# Patient Record
Sex: FEMALE | Race: WHITE | NOT HISPANIC OR LATINO | Employment: FULL TIME | URBAN - NONMETROPOLITAN AREA
[De-identification: names, ages, dates, MRNs, and addresses within clinical notes are randomized per-mention and may not be internally consistent; named-entity substitution may affect disease eponyms.]

---

## 2019-01-04 ENCOUNTER — OFFICE VISIT (OUTPATIENT)
Dept: URGENT CARE | Facility: PHYSICIAN GROUP | Age: 54
End: 2019-01-04
Payer: MEDICAID

## 2019-01-04 VITALS
TEMPERATURE: 98 F | HEART RATE: 134 BPM | BODY MASS INDEX: 24.66 KG/M2 | RESPIRATION RATE: 16 BRPM | SYSTOLIC BLOOD PRESSURE: 188 MMHG | HEIGHT: 62 IN | WEIGHT: 134 LBS | OXYGEN SATURATION: 95 % | DIASTOLIC BLOOD PRESSURE: 98 MMHG

## 2019-01-04 DIAGNOSIS — R51.9 NONINTRACTABLE HEADACHE, UNSPECIFIED CHRONICITY PATTERN, UNSPECIFIED HEADACHE TYPE: ICD-10-CM

## 2019-01-04 DIAGNOSIS — R42 DIZZY: ICD-10-CM

## 2019-01-04 DIAGNOSIS — R07.89 CHEST DISCOMFORT: ICD-10-CM

## 2019-01-04 DIAGNOSIS — R94.31 ABNORMAL EKG: ICD-10-CM

## 2019-01-04 DIAGNOSIS — R03.0 ELEVATED BLOOD PRESSURE READING: ICD-10-CM

## 2019-01-04 LAB
APPEARANCE UR: CLEAR
BILIRUB UR STRIP-MCNC: NORMAL MG/DL
COLOR UR AUTO: YELLOW
GLUCOSE UR STRIP.AUTO-MCNC: NORMAL MG/DL
KETONES UR STRIP.AUTO-MCNC: 80 MG/DL
LEUKOCYTE ESTERASE UR QL STRIP.AUTO: NORMAL
NITRITE UR QL STRIP.AUTO: NORMAL
PH UR STRIP.AUTO: 6 [PH] (ref 5–8)
PROT UR QL STRIP: 30 MG/DL
RBC UR QL AUTO: NORMAL
SP GR UR STRIP.AUTO: <1.005
UROBILINOGEN UR STRIP-MCNC: 0.2 MG/DL

## 2019-01-04 PROCEDURE — 99204 OFFICE O/P NEW MOD 45 MIN: CPT | Performed by: FAMILY MEDICINE

## 2019-01-04 PROCEDURE — 81002 URINALYSIS NONAUTO W/O SCOPE: CPT | Performed by: FAMILY MEDICINE

## 2019-01-04 PROCEDURE — 93000 ELECTROCARDIOGRAM COMPLETE: CPT | Performed by: FAMILY MEDICINE

## 2019-01-04 RX ORDER — LEVOTHYROXINE SODIUM 0.05 MG/1
TABLET ORAL
COMMUNITY
Start: 2018-10-22

## 2019-01-04 RX ORDER — ASPIRIN 81 MG/1
324 TABLET, CHEWABLE ORAL ONCE
Status: COMPLETED | OUTPATIENT
Start: 2019-01-04 | End: 2019-01-04

## 2019-01-04 RX ORDER — LISINOPRIL 10 MG/1
10 TABLET ORAL DAILY
Qty: 30 TAB | Refills: 0 | Status: SHIPPED | OUTPATIENT
Start: 2019-01-04 | End: 2019-02-03

## 2019-01-04 RX ADMIN — ASPIRIN 324 MG: 81 TABLET, CHEWABLE ORAL at 11:37

## 2019-01-04 ASSESSMENT — ENCOUNTER SYMPTOMS
CHILLS: 0
FOCAL WEAKNESS: 0
DIZZINESS: 1
HEADACHES: 1
ORTHOPNEA: 0
HEMOPTYSIS: 0
SHORTNESS OF BREATH: 0
FEVER: 0

## 2019-01-04 NOTE — PROGRESS NOTES
"Subjective:      Sariah Edwards is a 53 y.o. female who presents with Hypertension (Recent Chest Pain, elevated BP/HR)    - This is a very pleasant, well and non-toxic appearing 53 y.o. female with complaints of 3-4 days w/ BP running 180's/'s, having on/off non-specific headaches and last night felt slight pain to Rt side of chest and has been feeling \"hot\" lately. No NVFC, no diaphoresis or SOB          ALLERGIES:  Patient has no known allergies.     PMH:  History reviewed. No pertinent past medical history.     MEDS:    Current Outpatient Prescriptions:   •  lisinopril (PRINIVIL) 10 MG Tab, Take 1 Tab by mouth every day for 30 days., Disp: 30 Tab, Rfl: 0  •  metoprolol (LOPRESSOR) 25 MG Tab, 2 tabs BID, Disp: 60 Tab, Rfl: 0  •  levothyroxine (SYNTHROID) 50 MCG Tab, , Disp: , Rfl:     ** I have documented what I find to be significant in regards to past medical, social, family and surgical history  in my HPI or under PMH/PSH/FH review section, otherwise it is contributory **             HPI    Review of Systems   Constitutional: Negative for chills, diaphoresis and fever.   HENT: Negative for congestion.    Respiratory: Negative for cough, hemoptysis and shortness of breath.    Cardiovascular: Positive for chest pain. Negative for orthopnea.   Neurological: Positive for dizziness and headaches. Negative for focal weakness.   Psychiatric/Behavioral: The patient is nervous/anxious.    All other systems reviewed and are negative.         Objective:     BP (!) 188/98   Pulse (!) 134   Temp 36.7 °C (98 °F) (Temporal)   Resp 16   Ht 1.575 m (5' 2\")   Wt 60.8 kg (134 lb)   SpO2 95%   BMI 24.51 kg/m²    172/90  Physical Exam   Constitutional: She is oriented to person, place, and time. She appears well-developed. No distress.   HENT:   Head: Normocephalic and atraumatic.   Mouth/Throat: Oropharynx is clear and moist.   Eyes: Conjunctivae and EOM are normal.   Neck: Normal range of motion. Neck " supple. No tracheal deviation present.   Cardiovascular: Regular rhythm and normal heart sounds.  Exam reveals no gallop and no friction rub.    No murmur heard.  Pulmonary/Chest: Effort normal and breath sounds normal. No stridor. No respiratory distress. She has no wheezes. She has no rales.   Neurological: She is alert and oriented to person, place, and time. She exhibits normal muscle tone.   Skin: Skin is warm and dry. No pallor.   Psychiatric: She has a normal mood and affect. Her behavior is normal. Judgment and thought content normal.   Nursing note and vitals reviewed.              Assessment/Plan:         1. Elevated blood pressure reading  EKG - Clinic Performed    POCT Urinalysis    aspirin (ASA) chewable tab 324 mg    lisinopril (PRINIVIL) 10 MG Tab    metoprolol (LOPRESSOR) 25 MG Tab    REFERRAL TO [facility]ILY PRACTICE   2. Chest discomfort  aspirin (ASA) chewable tab 324 mg    REFERRAL TO FAMILY PRACTICE   3. Dizzy  aspirin (ASA) chewable tab 324 mg    REFERRAL TO FAMILY PRACTICE   4. Headache  aspirin (ASA) chewable tab 324 mg    REFERRAL TO FAM[facility] PRACTICE   5. Abnormal EKG  REFERRAL TO FAMILY PRACTICE       EKG: NSR, T-wave changes ant-lat leads      ASA 324mg chew  Will send to ER w/ EMS    She is currently declining. Risks/death/benefits heart attack explained. Will try have nurse speak with her some more about going to ER today

## 2019-01-05 ASSESSMENT — ENCOUNTER SYMPTOMS
DIAPHORESIS: 0
NERVOUS/ANXIOUS: 1
COUGH: 0

## 2019-01-05 NOTE — ADDENDUM NOTE
Addended by: YISSEL ARREDONDO on: 1/5/2019 08:45 AM     Modules accepted: Orders, Level of Service

## 2020-04-13 ENCOUNTER — OFFICE VISIT (OUTPATIENT)
Dept: URGENT CARE | Facility: PHYSICIAN GROUP | Age: 55
End: 2020-04-13
Payer: MEDICAID

## 2020-04-13 VITALS
WEIGHT: 156 LBS | RESPIRATION RATE: 16 BRPM | HEART RATE: 91 BPM | SYSTOLIC BLOOD PRESSURE: 98 MMHG | BODY MASS INDEX: 28.53 KG/M2 | DIASTOLIC BLOOD PRESSURE: 60 MMHG | TEMPERATURE: 97.5 F | OXYGEN SATURATION: 98 %

## 2020-04-13 DIAGNOSIS — R19.7 NAUSEA VOMITING AND DIARRHEA: ICD-10-CM

## 2020-04-13 DIAGNOSIS — R10.32 LLQ PAIN: ICD-10-CM

## 2020-04-13 DIAGNOSIS — R03.1 LOW BLOOD PRESSURE READING: ICD-10-CM

## 2020-04-13 DIAGNOSIS — R11.2 NAUSEA VOMITING AND DIARRHEA: ICD-10-CM

## 2020-04-13 LAB
APPEARANCE UR: NORMAL
BILIRUB UR STRIP-MCNC: NORMAL MG/DL
COLOR UR AUTO: YELLOW
GLUCOSE UR STRIP.AUTO-MCNC: NORMAL MG/DL
KETONES UR STRIP.AUTO-MCNC: NORMAL MG/DL
LEUKOCYTE ESTERASE UR QL STRIP.AUTO: NORMAL
NITRITE UR QL STRIP.AUTO: NORMAL
PH UR STRIP.AUTO: 6 [PH] (ref 5–8)
PROT UR QL STRIP: NORMAL MG/DL
RBC UR QL AUTO: NORMAL
SP GR UR STRIP.AUTO: 1.01
UROBILINOGEN UR STRIP-MCNC: 0.2 MG/DL

## 2020-04-13 PROCEDURE — 99214 OFFICE O/P EST MOD 30 MIN: CPT | Mod: 25 | Performed by: PHYSICIAN ASSISTANT

## 2020-04-13 PROCEDURE — 81002 URINALYSIS NONAUTO W/O SCOPE: CPT | Performed by: PHYSICIAN ASSISTANT

## 2020-04-13 RX ORDER — ONDANSETRON 4 MG/1
4 TABLET, ORALLY DISINTEGRATING ORAL EVERY 8 HOURS PRN
Qty: 20 TAB | Refills: 0 | Status: SHIPPED | OUTPATIENT
Start: 2020-04-13

## 2020-04-13 NOTE — PROGRESS NOTES
"Chief Complaint   Patient presents with   • Diarrhea     nausea, vomtting x2mos       HISTORY OF PRESENT ILLNESS: Patient is a 54 y.o. female who presents today for the following:    N/V/D started back up again around 2-3 weeks ago  Had it earlier in the year for about a month; resolved; apparently was given antibiotics for her symptoms  Episodes of vomiting today: usually in the morning and if eating/drinking anything  Episodes of loose stool today: \"constantly\"  Denies recent travel, antibiotics, bad food/drink, sick contacts  Fever: + chills/body aches  Localized abdominal pain: LUQ for the entire time  Urinary symptoms: none  LMP: 2 weeks ago   PCP: [facility]    There are no active problems to display for this patient.      Allergies:Patient has no known allergies.    Current Outpatient Medications Ordered in Epic   Medication Sig Dispense Refill   • ondansetron (ZOFRAN ODT) 4 MG TABLET DISPERSIBLE Take 1 Tab by mouth every 8 hours as needed for Nausea. 20 Tab 0   • levothyroxine (SYNTHROID) 50 MCG Tab      • metoprolol (LOPRESSOR) 25 MG Tab 2 tabs BID 60 Tab 0     No current Epic-ordered facility-administered medications on file.        History reviewed. No pertinent past medical history.    Social History     Tobacco Use   • Smoking status: Current Every Day Smoker   • Smokeless tobacco: Never Used   Substance Use Topics   • Alcohol use: Yes     Comment: 3x per week   • Drug use: No       No family status information on file.   History reviewed. No pertinent family history.    Review of Systems:    Constitutional ROS: No unexpected change in weight, No weakness, No fatigue  Eye ROS: No recent significant change in vision, No eye pain, redness, discharge  Ear ROS: No drainage, No tinnitus or vertigo, No recent change in hearing  Mouth/Throat ROS: No teeth or gum problems, No bleeding gums, No tongue complaints  Neck ROS: No swollen glands, No significant pain in neck  Pulmonary ROS: No chronic cough, " sputum, or hemoptysis, No dyspnea on exertion, No wheezing  Cardiovascular ROS: No diaphoresis, No edema, No palpitations  GI: Positive for nausea, vomiting, diarrhea.  Musculoskeletal/Extremities ROS: No peripheral edema, No pain, redness or swelling on the joints  Hematologic/Lymphatic ROS: No chills, No night sweats, No weight loss  Skin/Integumentary ROS: No edema, No evidence of rash, No itching    Exam:  BP (!) 98/60   Pulse 91   Temp 36.4 °C (97.5 °F) (Temporal)   Resp 16   Wt 70.8 kg (156 lb)   SpO2 98%   General: Well developed, well nourished. No distress.    Pulmonary: Unlabored respiratory effort. Lungs clear to auscultation, no wheezes, no rhonchi.    Cardiovascular: Regular rate and rhythm without murmur.   Neurologic: Grossly nonfocal. No facial asymmetry noted.  Abdomen: Soft, nondistended, nontender to palpation.  Bowel sounds within normal limits.  Lymph: No cervical lymphadenopathy noted.  Skin: Warm, dry, good turgor. No rashes in visible areas.   Psych: Normal mood. Alert and oriented to person, place and time.    UA: Positive ketones, otherwise negative    Assessment/Plan:  Patient has at the very end of the visit that she has had watery stools for 5 years.  She apparently has not discussed this with her primary care provider.  Discussed with patient that she likely needs to see GI for colonoscopy.  Patient does have some low blood pressure in the clinic that is likely due to dehydration.  Recommend Zofran to help rehydrate but have discussed strict ER precautions.  Patient verbalizes understanding and agrees with plan of care.  1. Nausea vomiting and diarrhea  POCT Urinalysis    ondansetron (ZOFRAN ODT) 4 MG TABLET DISPERSIBLE    CRYPTO/GIARDIA RAPID ASSAY    C DIFFICILE TOXINS A+B, EIA    CULTURE STOOL    Zofran as needed for nausea.  Patient declines having blood work for further evaluation.  Will contact patient with stool study results.   2. LLQ pain  POCT Urinalysis   3. Low blood  pressure reading      Likely due to dehydration.